# Patient Record
Sex: FEMALE | Race: WHITE | NOT HISPANIC OR LATINO | Employment: PART TIME | URBAN - METROPOLITAN AREA
[De-identification: names, ages, dates, MRNs, and addresses within clinical notes are randomized per-mention and may not be internally consistent; named-entity substitution may affect disease eponyms.]

---

## 2023-03-23 ENCOUNTER — E-VISIT (OUTPATIENT)
Dept: PRIMARY CARE | Facility: CLINIC | Age: 48
End: 2023-03-23
Payer: COMMERCIAL

## 2023-03-23 ENCOUNTER — DOCUMENTATION (OUTPATIENT)
Dept: PRIMARY CARE | Facility: CLINIC | Age: 48
End: 2023-03-23

## 2023-03-23 DIAGNOSIS — L08.9 SKIN INFECTION: Primary | ICD-10-CM

## 2023-03-23 PROCEDURE — 99213 OFFICE O/P EST LOW 20 MIN: CPT | Performed by: NURSE PRACTITIONER

## 2023-03-23 RX ORDER — CEPHALEXIN 500 MG/1
500 CAPSULE ORAL 2 TIMES DAILY
Qty: 10 CAPSULE | Refills: 0 | Status: SHIPPED | OUTPATIENT
Start: 2023-03-23 | End: 2023-03-28

## 2023-03-30 ENCOUNTER — TELEPHONE (OUTPATIENT)
Dept: PRIMARY CARE | Facility: CLINIC | Age: 48
End: 2023-03-30
Payer: COMMERCIAL

## 2023-03-30 NOTE — TELEPHONE ENCOUNTER
Patient did an e-visit for a wound on her finger, can view in chart  Was referred to derm, cannot get in for two weeks  Is going out of town next weekend and would like to been seen ASAP    Asking if we can see her for this or place a referral to derm - STAT if warranted?  Would refer to [facility]

## 2023-03-31 ENCOUNTER — OFFICE VISIT (OUTPATIENT)
Dept: PRIMARY CARE | Facility: CLINIC | Age: 48
End: 2023-03-31
Payer: COMMERCIAL

## 2023-03-31 VITALS
OXYGEN SATURATION: 98 % | DIASTOLIC BLOOD PRESSURE: 73 MMHG | WEIGHT: 149.8 LBS | TEMPERATURE: 97.6 F | HEART RATE: 78 BPM | SYSTOLIC BLOOD PRESSURE: 109 MMHG | HEIGHT: 68 IN | BODY MASS INDEX: 22.7 KG/M2

## 2023-03-31 DIAGNOSIS — S61.208A OPEN WOUND OF LITTLE FINGER: Primary | ICD-10-CM

## 2023-03-31 PROBLEM — R68.82 LOW LIBIDO: Status: ACTIVE | Noted: 2021-02-04

## 2023-03-31 PROBLEM — R16.1 SPLENOMEGALY: Status: ACTIVE | Noted: 2023-03-31

## 2023-03-31 PROBLEM — D64.9 ANEMIA: Status: ACTIVE | Noted: 2023-03-31

## 2023-03-31 PROBLEM — E78.5 HYPERLIPIDEMIA: Status: ACTIVE | Noted: 2023-03-31

## 2023-03-31 PROBLEM — D69.6 THROMBOCYTOPENIA (CMS-HCC): Status: ACTIVE | Noted: 2019-01-10

## 2023-03-31 PROBLEM — F32.A ANXIETY AND DEPRESSION: Status: ACTIVE | Noted: 2023-03-31

## 2023-03-31 PROBLEM — M67.40 GANGLION CYST: Status: ACTIVE | Noted: 2023-03-31

## 2023-03-31 PROBLEM — K21.9 CHRONIC GERD: Status: ACTIVE | Noted: 2023-03-31

## 2023-03-31 PROBLEM — F41.9 ANXIETY AND DEPRESSION: Status: ACTIVE | Noted: 2023-03-31

## 2023-03-31 PROBLEM — K59.09 OTHER CONSTIPATION: Status: ACTIVE | Noted: 2023-03-31

## 2023-03-31 PROBLEM — G43.909 MIGRAINES: Status: ACTIVE | Noted: 2023-03-31

## 2023-03-31 PROBLEM — N89.8 VAGINAL DRYNESS: Status: ACTIVE | Noted: 2021-02-04

## 2023-03-31 PROBLEM — N60.19 DIFFUSE CYSTIC MASTOPATHY: Status: ACTIVE | Noted: 2023-03-31

## 2023-03-31 PROBLEM — E55.9 VITAMIN D DEFICIENCY: Status: ACTIVE | Noted: 2023-03-31

## 2023-03-31 PROBLEM — R79.89 ABNORMAL CBC: Status: ACTIVE | Noted: 2023-03-31

## 2023-03-31 PROBLEM — R23.2 HOT FLASHES: Status: ACTIVE | Noted: 2021-02-04

## 2023-03-31 PROCEDURE — 1036F TOBACCO NON-USER: CPT | Performed by: NURSE PRACTITIONER

## 2023-03-31 PROCEDURE — 99213 OFFICE O/P EST LOW 20 MIN: CPT | Performed by: NURSE PRACTITIONER

## 2023-03-31 RX ORDER — ESCITALOPRAM OXALATE 5 MG/1
1 TABLET ORAL
COMMUNITY
Start: 2021-04-23 | End: 2023-08-29 | Stop reason: SDUPTHER

## 2023-03-31 RX ORDER — CHOLECALCIFEROL (VITAMIN D3) 125 MCG
1 CAPSULE ORAL DAILY
COMMUNITY
Start: 2019-11-27

## 2023-03-31 RX ORDER — FERROUS SULFATE 325(65) MG
65 TABLET ORAL DAILY
COMMUNITY
Start: 2022-09-30 | End: 2023-04-18

## 2023-03-31 RX ORDER — OMEPRAZOLE 20 MG/1
20 TABLET, DELAYED RELEASE ORAL AS NEEDED
COMMUNITY
Start: 2022-08-23 | End: 2023-08-29 | Stop reason: ALTCHOICE

## 2023-03-31 RX ORDER — CEPHALEXIN 500 MG/1
500 CAPSULE ORAL 2 TIMES DAILY
Qty: 14 CAPSULE | Refills: 0 | Status: SHIPPED | OUTPATIENT
Start: 2023-03-31 | End: 2023-04-07

## 2023-03-31 RX ORDER — MULTIVITAMIN
1 TABLET ORAL DAILY
COMMUNITY
Start: 2022-08-23

## 2023-03-31 NOTE — PROGRESS NOTES
"Subjective   Patient ID: Christina Landry is a 47 y.o. female who presents for finger wound (Started out as a bump on pinky/Picked at it a little thought may have had a splinter in it tried to get it out/Has not gotten any better skin is hanging on a little and bleeding little and painful/She did an evisit through my chart and np prescibed antibiotics/For 5 days she has completed that /Was referred to dermatology but cannot get in for 2 weeks/Has appt next Thursday with derm).  HPI  No trauma  Looked like a dry raised bump  Wet  Very tender  Fold of finger seems bruised  Size is larger  No fever  No loss strength or sensation  Elbow felt achy    Review of Systems   All other systems reviewed and are negative.      BP Readings from Last 3 Encounters:   03/31/23 109/73   08/23/22 123/75   04/25/22 112/72      Wt Readings from Last 3 Encounters:   03/31/23 67.9 kg (149 lb 12.8 oz)   08/23/22 65.5 kg (144 lb 6 oz)   04/25/22 64.9 kg (143 lb)      BMI:   Estimated body mass index is 23.12 kg/m² as calculated from the following:    Height as of this encounter: 1.715 m (5' 7.5\").    Weight as of this encounter: 67.9 kg (149 lb 12.8 oz).    Objective   Physical Exam  Constitutional:       Appearance: Normal appearance.   HENT:      Head: Normocephalic.      Nose: Nose normal.   Eyes:      Conjunctiva/sclera: Conjunctivae normal.   Pulmonary:      Effort: Pulmonary effort is normal.   Musculoskeletal:      Cervical back: Normal range of motion.      Comments: R little finger ROM intact  Circular open wound: indurated flap of skin overlying area of yellow pus and ? Cystic growth    Neurological:      Mental Status: She is alert.         Assessment/Plan   Problem List Items Addressed This Visit    None  Visit Diagnoses       Open wound of little finger    -  Primary    restart keflex  arranged for her an appt at [facility] wound center on Monday  patient appreciative    Relevant Medications    cephalexin (Keflex) 500 mg capsule      "

## 2023-04-13 DIAGNOSIS — D64.9 ANEMIA, UNSPECIFIED TYPE: Primary | ICD-10-CM

## 2023-04-18 RX ORDER — FERROUS SULFATE 325(65) MG
TABLET ORAL
Qty: 90 TABLET | Refills: 1 | Status: SHIPPED | OUTPATIENT
Start: 2023-04-18

## 2023-08-26 ASSESSMENT — PROMIS GLOBAL HEALTH SCALE
EMOTIONAL_PROBLEMS: SOMETIMES
RATE_GENERAL_HEALTH: VERY GOOD
CARRYOUT_PHYSICAL_ACTIVITIES: COMPLETELY
RATE_MENTAL_HEALTH: GOOD
RATE_QUALITY_OF_LIFE: VERY GOOD
CARRYOUT_SOCIAL_ACTIVITIES: VERY GOOD
RATE_AVERAGE_PAIN: 0
RATE_SOCIAL_SATISFACTION: VERY GOOD
RATE_AVERAGE_FATIGUE: MILD
RATE_PHYSICAL_HEALTH: GOOD

## 2023-08-29 ENCOUNTER — OFFICE VISIT (OUTPATIENT)
Dept: PRIMARY CARE | Facility: CLINIC | Age: 48
End: 2023-08-29
Payer: COMMERCIAL

## 2023-08-29 VITALS
DIASTOLIC BLOOD PRESSURE: 78 MMHG | WEIGHT: 150 LBS | HEIGHT: 67 IN | OXYGEN SATURATION: 99 % | HEART RATE: 80 BPM | BODY MASS INDEX: 23.54 KG/M2 | TEMPERATURE: 98.5 F | SYSTOLIC BLOOD PRESSURE: 120 MMHG

## 2023-08-29 DIAGNOSIS — Z00.00 WELL ADULT EXAM: Primary | ICD-10-CM

## 2023-08-29 DIAGNOSIS — E78.2 MIXED HYPERLIPIDEMIA: ICD-10-CM

## 2023-08-29 DIAGNOSIS — E55.9 VITAMIN D DEFICIENCY: ICD-10-CM

## 2023-08-29 DIAGNOSIS — F41.9 ANXIETY: ICD-10-CM

## 2023-08-29 DIAGNOSIS — G43.809 OTHER MIGRAINE WITHOUT STATUS MIGRAINOSUS, NOT INTRACTABLE: ICD-10-CM

## 2023-08-29 DIAGNOSIS — E61.1 LOW IRON: ICD-10-CM

## 2023-08-29 DIAGNOSIS — Z12.31 SCREENING MAMMOGRAM, ENCOUNTER FOR: ICD-10-CM

## 2023-08-29 PROBLEM — K21.9 CHRONIC GERD: Status: RESOLVED | Noted: 2023-03-31 | Resolved: 2023-08-29

## 2023-08-29 PROBLEM — R16.1 SPLENOMEGALY: Status: RESOLVED | Noted: 2023-03-31 | Resolved: 2023-08-29

## 2023-08-29 PROBLEM — R10.2 SUPRAPUBIC PRESSURE: Status: ACTIVE | Noted: 2021-02-04

## 2023-08-29 PROBLEM — R23.2 HOT FLASHES: Status: RESOLVED | Noted: 2021-02-04 | Resolved: 2023-08-29

## 2023-08-29 PROBLEM — D69.6 THROMBOCYTOPENIA (CMS-HCC): Status: RESOLVED | Noted: 2019-01-10 | Resolved: 2023-08-29

## 2023-08-29 PROBLEM — R79.89 ABNORMAL CBC: Status: RESOLVED | Noted: 2023-03-31 | Resolved: 2023-08-29

## 2023-08-29 PROCEDURE — 99213 OFFICE O/P EST LOW 20 MIN: CPT | Performed by: INTERNAL MEDICINE

## 2023-08-29 PROCEDURE — 1036F TOBACCO NON-USER: CPT | Performed by: INTERNAL MEDICINE

## 2023-08-29 PROCEDURE — 99396 PREV VISIT EST AGE 40-64: CPT | Performed by: INTERNAL MEDICINE

## 2023-08-29 RX ORDER — ESCITALOPRAM OXALATE 5 MG/1
5 TABLET ORAL
Qty: 90 TABLET | Refills: 3 | Status: SHIPPED | OUTPATIENT
Start: 2023-08-29

## 2023-08-29 ASSESSMENT — PATIENT HEALTH QUESTIONNAIRE - PHQ9
1. LITTLE INTEREST OR PLEASURE IN DOING THINGS: NOT AT ALL
2. FEELING DOWN, DEPRESSED OR HOPELESS: NOT AT ALL
SUM OF ALL RESPONSES TO PHQ9 QUESTIONS 1 AND 2: 0

## 2023-08-29 NOTE — PROGRESS NOTES
"Subjective       Current Issues:  Current concerns include period today  No other concerns.  Sleep: all night  No bowel or bladder issues  No cp or sob or depression  Has dog   Anxiety well controlled w lexapro  Will do gyn exam next visit  Review of Nutrition:  Current diet: good  Exercise discussed  Walking dog    Gen:  no fever  HEENT:  no trouble swallowing  CV:  no dyspnea, cyanosis  Lungs:  no shortness of breath  GI:  no constipation, no blood in stool  Vascular:  no edema  Neuro:   no weakness  Skin:  no rash  MS:no joint swelling  Gu:  no urinary complaints  All other systems have been reviewed and are negative for complaint      Screening Questions:  Objective   /78   Pulse 80   Temp 36.9 °C (98.5 °F) (Temporal)   Ht 1.689 m (5' 6.5\")   Wt 68 kg (150 lb)   SpO2 99%   BMI 23.85 kg/m²       General:   alert and oriented, in no acute distress   Gait:   normal   Skin:   normal   Oral cavity:   lips, mucosa, and tongue normal; teeth and gums normal   Eyes:   sclerae white, pupils equal and reactive   Ears:   normal bilaterally Tms grey   Neck:   no adenopathy and thyroid not enlarged, symmetric, no tenderness/mass/nodules   Lungs:  clear to auscultation bilaterally   Heart:   regular rate and rhythm, S1, S2 normal, no murmur, click, rub or gallop   Abdomen:  soft, non-tender; bowel sounds normal; no masses, no organomegaly   : ne       Extremities:  extremities normal, warm and well-perfused; no cyanosis, clubbing, or edema,   Neuro:  normal without focal findings and muscle tone and strength normal and symmetric       Problem List Items Addressed This Visit       Hyperlipidemia    Vitamin D deficiency    Relevant Orders    Comprehensive Metabolic Panel    TSH with reflex to Free T4 if abnormal    Vitamin D, Total    CBC and Auto Differential    Lipid Panel    Iron and TIBC    Ferritin    Migraines    Relevant Orders    Comprehensive Metabolic Panel    TSH with reflex to Free T4 if abnormal    " Vitamin D, Total    CBC and Auto Differential    Lipid Panel    Iron and TIBC    Ferritin    Vitamin B12     Other Visit Diagnoses       Well adult exam    -  Primary    Relevant Orders    Comprehensive Metabolic Panel    TSH with reflex to Free T4 if abnormal    Vitamin D, Total    CBC and Auto Differential    Lipid Panel    Iron and TIBC    Ferritin    Screening mammogram, encounter for        Relevant Orders    BI mammo bilateral screening tomosynthesis    Comprehensive Metabolic Panel    TSH with reflex to Free T4 if abnormal    Vitamin D, Total    CBC and Auto Differential    Lipid Panel    Iron and TIBC    Ferritin    Low iron        Relevant Orders    Comprehensive Metabolic Panel    TSH with reflex to Free T4 if abnormal    Vitamin D, Total    CBC and Auto Differential    Lipid Panel    Iron and TIBC    Ferritin    Anxiety        Relevant Medications    escitalopram (Lexapro) 5 mg tablet           Chronic conditions reviewed in the assessment and plan.    Continue medications unless specified otherwise.  Previous labs reviewed.     Follow up in 6 months.

## 2023-09-11 ENCOUNTER — LAB (OUTPATIENT)
Dept: LAB | Facility: LAB | Age: 48
End: 2023-09-11
Payer: COMMERCIAL

## 2023-09-11 DIAGNOSIS — Z00.00 WELL ADULT EXAM: ICD-10-CM

## 2023-09-11 DIAGNOSIS — G43.809 OTHER MIGRAINE WITHOUT STATUS MIGRAINOSUS, NOT INTRACTABLE: ICD-10-CM

## 2023-09-11 DIAGNOSIS — E55.9 VITAMIN D DEFICIENCY: ICD-10-CM

## 2023-09-11 DIAGNOSIS — E61.1 LOW IRON: ICD-10-CM

## 2023-09-11 DIAGNOSIS — Z12.31 SCREENING MAMMOGRAM, ENCOUNTER FOR: ICD-10-CM

## 2023-09-11 LAB
ALANINE AMINOTRANSFERASE (SGPT) (U/L) IN SER/PLAS: 15 U/L (ref 7–45)
ALBUMIN (G/DL) IN SER/PLAS: 4.3 G/DL (ref 3.4–5)
ALKALINE PHOSPHATASE (U/L) IN SER/PLAS: 50 U/L (ref 33–110)
ANION GAP IN SER/PLAS: 12 MMOL/L (ref 10–20)
ASPARTATE AMINOTRANSFERASE (SGOT) (U/L) IN SER/PLAS: 16 U/L (ref 9–39)
BASOPHILS (10*3/UL) IN BLOOD BY AUTOMATED COUNT: 0.06 X10E9/L (ref 0–0.1)
BASOPHILS/100 LEUKOCYTES IN BLOOD BY AUTOMATED COUNT: 1.3 % (ref 0–2)
BILIRUBIN TOTAL (MG/DL) IN SER/PLAS: 0.4 MG/DL (ref 0–1.2)
CALCIDIOL (25 OH VITAMIN D3) (NG/ML) IN SER/PLAS: 25 NG/ML
CALCIUM (MG/DL) IN SER/PLAS: 9.3 MG/DL (ref 8.6–10.3)
CARBON DIOXIDE, TOTAL (MMOL/L) IN SER/PLAS: 28 MMOL/L (ref 21–32)
CHLORIDE (MMOL/L) IN SER/PLAS: 104 MMOL/L (ref 98–107)
CHOLESTEROL (MG/DL) IN SER/PLAS: 226 MG/DL (ref 0–199)
CHOLESTEROL IN HDL (MG/DL) IN SER/PLAS: 68.9 MG/DL
CHOLESTEROL/HDL RATIO: 3.3
COBALAMIN (VITAMIN B12) (PG/ML) IN SER/PLAS: 352 PG/ML (ref 211–911)
CREATININE (MG/DL) IN SER/PLAS: 0.66 MG/DL (ref 0.5–1.05)
EOSINOPHILS (10*3/UL) IN BLOOD BY AUTOMATED COUNT: 0.13 X10E9/L (ref 0–0.7)
EOSINOPHILS/100 LEUKOCYTES IN BLOOD BY AUTOMATED COUNT: 2.8 % (ref 0–6)
ERYTHROCYTE DISTRIBUTION WIDTH (RATIO) BY AUTOMATED COUNT: 12.6 % (ref 11.5–14.5)
ERYTHROCYTE MEAN CORPUSCULAR HEMOGLOBIN CONCENTRATION (G/DL) BY AUTOMATED: 31.8 G/DL (ref 32–36)
ERYTHROCYTE MEAN CORPUSCULAR VOLUME (FL) BY AUTOMATED COUNT: 93 FL (ref 80–100)
ERYTHROCYTES (10*6/UL) IN BLOOD BY AUTOMATED COUNT: 4.29 X10E12/L (ref 4–5.2)
FERRITIN (UG/LL) IN SER/PLAS: 34 UG/L (ref 8–150)
GFR FEMALE: >90 ML/MIN/1.73M2
GLUCOSE (MG/DL) IN SER/PLAS: 87 MG/DL (ref 74–99)
HEMATOCRIT (%) IN BLOOD BY AUTOMATED COUNT: 40 % (ref 36–46)
HEMOGLOBIN (G/DL) IN BLOOD: 12.7 G/DL (ref 12–16)
IMMATURE GRANULOCYTES/100 LEUKOCYTES IN BLOOD BY AUTOMATED COUNT: 0.2 % (ref 0–0.9)
IRON (UG/DL) IN SER/PLAS: 93 UG/DL (ref 35–150)
IRON BINDING CAPACITY (UG/DL) IN SER/PLAS: 332 UG/DL (ref 240–445)
IRON SATURATION (%) IN SER/PLAS: 28 % (ref 25–45)
LDL: 137 MG/DL (ref 0–99)
LEUKOCYTES (10*3/UL) IN BLOOD BY AUTOMATED COUNT: 4.6 X10E9/L (ref 4.4–11.3)
LYMPHOCYTES (10*3/UL) IN BLOOD BY AUTOMATED COUNT: 0.87 X10E9/L (ref 1.2–4.8)
LYMPHOCYTES/100 LEUKOCYTES IN BLOOD BY AUTOMATED COUNT: 18.8 % (ref 13–44)
MONOCYTES (10*3/UL) IN BLOOD BY AUTOMATED COUNT: 0.35 X10E9/L (ref 0.1–1)
MONOCYTES/100 LEUKOCYTES IN BLOOD BY AUTOMATED COUNT: 7.6 % (ref 2–10)
NEUTROPHILS (10*3/UL) IN BLOOD BY AUTOMATED COUNT: 3.2 X10E9/L (ref 1.2–7.7)
NEUTROPHILS/100 LEUKOCYTES IN BLOOD BY AUTOMATED COUNT: 69.3 % (ref 40–80)
PLATELETS (10*3/UL) IN BLOOD AUTOMATED COUNT: 170 X10E9/L (ref 150–450)
POTASSIUM (MMOL/L) IN SER/PLAS: 4.8 MMOL/L (ref 3.5–5.3)
PROTEIN TOTAL: 6.4 G/DL (ref 6.4–8.2)
SODIUM (MMOL/L) IN SER/PLAS: 139 MMOL/L (ref 136–145)
THYROTROPIN (MIU/L) IN SER/PLAS BY DETECTION LIMIT <= 0.05 MIU/L: 1.04 MIU/L (ref 0.44–3.98)
TRIGLYCERIDE (MG/DL) IN SER/PLAS: 102 MG/DL (ref 0–149)
UREA NITROGEN (MG/DL) IN SER/PLAS: 11 MG/DL (ref 6–23)
VLDL: 20 MG/DL (ref 0–40)

## 2023-09-11 PROCEDURE — 83550 IRON BINDING TEST: CPT

## 2023-09-11 PROCEDURE — 36415 COLL VENOUS BLD VENIPUNCTURE: CPT

## 2023-09-11 PROCEDURE — 82306 VITAMIN D 25 HYDROXY: CPT

## 2023-09-11 PROCEDURE — 84443 ASSAY THYROID STIM HORMONE: CPT

## 2023-09-11 PROCEDURE — 83540 ASSAY OF IRON: CPT

## 2023-09-11 PROCEDURE — 82728 ASSAY OF FERRITIN: CPT

## 2023-09-11 PROCEDURE — 82607 VITAMIN B-12: CPT

## 2023-09-11 PROCEDURE — 80053 COMPREHEN METABOLIC PANEL: CPT

## 2023-09-11 PROCEDURE — 85025 COMPLETE CBC W/AUTO DIFF WBC: CPT

## 2023-09-11 PROCEDURE — 80061 LIPID PANEL: CPT

## 2023-09-27 DIAGNOSIS — E55.9 VITAMIN D DEFICIENCY: ICD-10-CM

## 2023-10-03 ENCOUNTER — APPOINTMENT (OUTPATIENT)
Dept: PRIMARY CARE | Facility: CLINIC | Age: 48
End: 2023-10-03
Payer: COMMERCIAL

## 2023-10-03 PROCEDURE — 87086 URINE CULTURE/COLONY COUNT: CPT

## 2023-10-04 ENCOUNTER — APPOINTMENT (OUTPATIENT)
Dept: PRIMARY CARE | Facility: CLINIC | Age: 48
End: 2023-10-04
Payer: COMMERCIAL

## 2023-10-04 ENCOUNTER — LAB REQUISITION (OUTPATIENT)
Dept: LAB | Facility: HOSPITAL | Age: 48
End: 2023-10-04
Payer: COMMERCIAL

## 2023-10-05 LAB — BACTERIA UR CULT: NORMAL

## 2024-03-08 ENCOUNTER — OFFICE VISIT (OUTPATIENT)
Dept: PRIMARY CARE | Facility: CLINIC | Age: 49
End: 2024-03-08
Payer: COMMERCIAL

## 2024-03-08 ENCOUNTER — HOSPITAL ENCOUNTER (OUTPATIENT)
Dept: RADIOLOGY | Facility: CLINIC | Age: 49
Discharge: HOME | End: 2024-03-08
Payer: COMMERCIAL

## 2024-03-08 ENCOUNTER — APPOINTMENT (OUTPATIENT)
Dept: PRIMARY CARE | Facility: CLINIC | Age: 49
End: 2024-03-08
Payer: COMMERCIAL

## 2024-03-08 ENCOUNTER — LAB (OUTPATIENT)
Dept: LAB | Facility: LAB | Age: 49
End: 2024-03-08
Payer: COMMERCIAL

## 2024-03-08 VITALS
DIASTOLIC BLOOD PRESSURE: 69 MMHG | SYSTOLIC BLOOD PRESSURE: 108 MMHG | BODY MASS INDEX: 24.33 KG/M2 | TEMPERATURE: 98.2 F | HEIGHT: 67 IN | WEIGHT: 155 LBS | HEART RATE: 88 BPM | OXYGEN SATURATION: 97 %

## 2024-03-08 DIAGNOSIS — E55.9 VITAMIN D DEFICIENCY: ICD-10-CM

## 2024-03-08 DIAGNOSIS — R00.2 FLUTTERING SENSATION OF HEART: ICD-10-CM

## 2024-03-08 DIAGNOSIS — R00.2 FLUTTERING SENSATION OF HEART: Primary | ICD-10-CM

## 2024-03-08 LAB
25(OH)D3 SERPL-MCNC: 34 NG/ML (ref 30–100)
ALBUMIN SERPL BCP-MCNC: 4.1 G/DL (ref 3.4–5)
ALP SERPL-CCNC: 50 U/L (ref 33–110)
ALT SERPL W P-5'-P-CCNC: 12 U/L (ref 7–45)
ANION GAP SERPL CALC-SCNC: <7 MMOL/L (ref 10–20)
AST SERPL W P-5'-P-CCNC: 16 U/L (ref 9–39)
BASOPHILS # BLD AUTO: 0.05 X10*3/UL (ref 0–0.1)
BASOPHILS NFR BLD AUTO: 1 %
BILIRUB SERPL-MCNC: 0.3 MG/DL (ref 0–1.2)
BUN SERPL-MCNC: 11 MG/DL (ref 6–23)
CALCIUM SERPL-MCNC: 9.3 MG/DL (ref 8.6–10.3)
CHLORIDE SERPL-SCNC: 105 MMOL/L (ref 98–107)
CO2 SERPL-SCNC: 32 MMOL/L (ref 21–32)
CREAT SERPL-MCNC: 0.58 MG/DL (ref 0.5–1.05)
EGFRCR SERPLBLD CKD-EPI 2021: >90 ML/MIN/1.73M*2
EOSINOPHIL # BLD AUTO: 0.08 X10*3/UL (ref 0–0.7)
EOSINOPHIL NFR BLD AUTO: 1.7 %
ERYTHROCYTE [DISTWIDTH] IN BLOOD BY AUTOMATED COUNT: 13.1 % (ref 11.5–14.5)
FERRITIN SERPL-MCNC: 34 NG/ML (ref 8–150)
GLUCOSE SERPL-MCNC: 89 MG/DL (ref 74–99)
HCT VFR BLD AUTO: 36.6 % (ref 36–46)
HGB BLD-MCNC: 11.8 G/DL (ref 12–16)
IMM GRANULOCYTES # BLD AUTO: 0.01 X10*3/UL (ref 0–0.7)
IMM GRANULOCYTES NFR BLD AUTO: 0.2 % (ref 0–0.9)
IRON SATN MFR SERPL: 16 % (ref 25–45)
IRON SERPL-MCNC: 50 UG/DL (ref 35–150)
LYMPHOCYTES # BLD AUTO: 1.09 X10*3/UL (ref 1.2–4.8)
LYMPHOCYTES NFR BLD AUTO: 22.6 %
MAGNESIUM SERPL-MCNC: 1.97 MG/DL (ref 1.6–2.4)
MCH RBC QN AUTO: 28.9 PG (ref 26–34)
MCHC RBC AUTO-ENTMCNC: 32.2 G/DL (ref 32–36)
MCV RBC AUTO: 90 FL (ref 80–100)
MONOCYTES # BLD AUTO: 0.4 X10*3/UL (ref 0.1–1)
MONOCYTES NFR BLD AUTO: 8.3 %
NEUTROPHILS # BLD AUTO: 3.2 X10*3/UL (ref 1.2–7.7)
NEUTROPHILS NFR BLD AUTO: 66.2 %
NRBC BLD-RTO: 0 /100 WBCS (ref 0–0)
PLATELET # BLD AUTO: 174 X10*3/UL (ref 150–450)
POTASSIUM SERPL-SCNC: 4.2 MMOL/L (ref 3.5–5.3)
PROT SERPL-MCNC: 6.4 G/DL (ref 6.4–8.2)
RBC # BLD AUTO: 4.08 X10*6/UL (ref 4–5.2)
SODIUM SERPL-SCNC: 139 MMOL/L (ref 136–145)
TIBC SERPL-MCNC: 312 UG/DL (ref 240–445)
TSH SERPL-ACNC: 0.88 MIU/L (ref 0.44–3.98)
UIBC SERPL-MCNC: 262 UG/DL (ref 110–370)
VIT B12 SERPL-MCNC: 472 PG/ML (ref 211–911)
WBC # BLD AUTO: 4.8 X10*3/UL (ref 4.4–11.3)

## 2024-03-08 PROCEDURE — 83550 IRON BINDING TEST: CPT

## 2024-03-08 PROCEDURE — 85025 COMPLETE CBC W/AUTO DIFF WBC: CPT

## 2024-03-08 PROCEDURE — 83540 ASSAY OF IRON: CPT

## 2024-03-08 PROCEDURE — 99214 OFFICE O/P EST MOD 30 MIN: CPT | Performed by: NURSE PRACTITIONER

## 2024-03-08 PROCEDURE — 82728 ASSAY OF FERRITIN: CPT

## 2024-03-08 PROCEDURE — 36415 COLL VENOUS BLD VENIPUNCTURE: CPT

## 2024-03-08 PROCEDURE — 80053 COMPREHEN METABOLIC PANEL: CPT

## 2024-03-08 PROCEDURE — 1036F TOBACCO NON-USER: CPT | Performed by: NURSE PRACTITIONER

## 2024-03-08 PROCEDURE — 82306 VITAMIN D 25 HYDROXY: CPT

## 2024-03-08 PROCEDURE — 82607 VITAMIN B-12: CPT

## 2024-03-08 PROCEDURE — 83735 ASSAY OF MAGNESIUM: CPT

## 2024-03-08 PROCEDURE — 93000 ELECTROCARDIOGRAM COMPLETE: CPT | Performed by: NURSE PRACTITIONER

## 2024-03-08 PROCEDURE — 84443 ASSAY THYROID STIM HORMONE: CPT

## 2024-03-08 PROCEDURE — 71046 X-RAY EXAM CHEST 2 VIEWS: CPT

## 2024-03-08 PROCEDURE — 71046 X-RAY EXAM CHEST 2 VIEWS: CPT | Performed by: RADIOLOGY

## 2024-03-08 ASSESSMENT — PATIENT HEALTH QUESTIONNAIRE - PHQ9
SUM OF ALL RESPONSES TO PHQ9 QUESTIONS 1 AND 2: 0
2. FEELING DOWN, DEPRESSED OR HOPELESS: NOT AT ALL
1. LITTLE INTEREST OR PLEASURE IN DOING THINGS: NOT AT ALL

## 2024-03-08 NOTE — PROGRESS NOTES
Problem List Items Addressed This Visit    None  Visit Diagnoses       Fluttering sensation of heart    -  Primary    on/off at least 12 y  has seen cards  more frequent past couple days  EKG stable  labs  chest XR given recent illness  zio  stop caffeine  fu cards    Relevant Orders    ECG 12 lead (Clinic Performed) (Completed)    CBC and Auto Differential    Comprehensive Metabolic Panel    Magnesium    Iron and TIBC    Ferritin    Vitamin B12    XR chest 2 views    TSH with reflex to Free T4 if abnormal    Referral to Cardiology    Holter or Event Cardiac Monitor             Subjective   Patient ID: Christina Landry is a 48 y.o. female who presents for Irregular Heart Beat (Feels like flutters in chest /Doesn't get lighted or dizzy /No numbness in any arms or pain associated with it/Had it when her daughter was younger - over the last 12 years /10x's yesterday ).  HPI  Heart flutter  Comes & goes   She did see cards for this about 12 or so years ago  She did wear monitor  Recalls extra beat here and there    Noticed couple days ago a few times  Yesterday about 10 times  Couple times so far today    Exacerbated by:   Relieved by: ? Meditation   Caffeine sensitive  - 16 oz daily coffee     Recent illness  Early Feb  Last almost 2 weeks  Was covid neg x 2     No new meds, herbals, supps or teas  Tries to drink water  - doesn't always     Component      Latest Ref Rng 9/11/2023   WBC      4.4 - 11.3 x10E9/L 4.6    RBC      4.00 - 5.20 x10E12/L 4.29    HEMOGLOBIN      12.0 - 16.0 g/dL 12.7    HEMATOCRIT      36.0 - 46.0 % 40.0    MCV      80 - 100 fL 93    MCHC      32.0 - 36.0 g/dL 31.8 (L)    Platelets      150 - 450 x10E9/L 170    RED CELL DISTRIBUTION WIDTH      11.5 - 14.5 % 12.6    Neutrophils %      40.0 - 80.0 % 69.3    Immature Granulocytes %, Automated      0.0 - 0.9 % 0.2    Lymphocytes %      13.0 - 44.0 % 18.8    Monocytes %      2.0 - 10.0 % 7.6    Eosinophils %      0.0 - 6.0 % 2.8    Basophils %      0.0 -  "2.0 % 1.3    Neutrophils Absolute      1.20 - 7.70 x10E9/L 3.20    Lymphocytes Absolute      1.20 - 4.80 x10E9/L 0.87 (L)    Monocytes Absolute      0.10 - 1.00 x10E9/L 0.35    Eosinophils Absolute      0.00 - 0.70 x10E9/L 0.13    Basophils Absolute      0.00 - 0.10 x10E9/L 0.06    GLUCOSE      74 - 99 mg/dL 87    SODIUM      136 - 145 mmol/L 139    POTASSIUM      3.5 - 5.3 mmol/L 4.8    CHLORIDE      98 - 107 mmol/L 104    Bicarbonate      21 - 32 mmol/L 28    Anion Gap      10 - 20 mmol/L 12    Blood Urea Nitrogen      6 - 23 mg/dL 11    Creatinine      0.50 - 1.05 mg/dL 0.66    GFR Female      >90 mL/min/1.73m2 >90    Calcium      8.6 - 10.3 mg/dL 9.3    Albumin      3.4 - 5.0 g/dL 4.3    Alkaline Phosphatase      33 - 110 U/L 50    Total Protein      6.4 - 8.2 g/dL 6.4    AST      9 - 39 U/L 16    Bilirubin Total      0.0 - 1.2 mg/dL 0.4    ALT      7 - 45 U/L 15    CHOLESTEROL      0 - 199 mg/dL 226 (H)    HDL CHOLESTEROL      mg/dL 68.9    Cholesterol/HDL Ratio 3.3    LDL Calculated      0 - 99 mg/dL 137 (H)    VLDL      0 - 40 mg/dL 20    TRIGLYCERIDES      0 - 149 mg/dL 102    IRON      35 - 150 ug/dL 93    TIBC      240 - 445 ug/dL 332    % Saturation      25 - 45 % 28    Thyroid Stimulating Hormone      0.44 - 3.98 mIU/L 1.04    Vitamin D, 25-Hydroxy, Total      ng/mL 25 !    FERRITIN      8 - 150 ug/L 34    Vitamin B12      211 - 911 pg/mL 352         Review of Systems   All other systems reviewed and are negative.      BP Readings from Last 3 Encounters:   03/08/24 108/69   08/29/23 120/78   03/31/23 109/73      Wt Readings from Last 3 Encounters:   03/08/24 70.3 kg (155 lb)   08/29/23 68 kg (150 lb)   03/31/23 67.9 kg (149 lb 12.8 oz)      BMI:   Estimated body mass index is 24.64 kg/m² as calculated from the following:    Height as of this encounter: 1.689 m (5' 6.5\").    Weight as of this encounter: 70.3 kg (155 lb).    Objective   Physical Exam  Constitutional:       General: She is not in acute " distress.  HENT:      Head: Normocephalic and atraumatic.      Right Ear: Tympanic membrane and external ear normal.      Left Ear: Tympanic membrane and external ear normal.      Nose: Nose normal.      Mouth/Throat:      Mouth: Mucous membranes are moist.   Eyes:      Extraocular Movements: Extraocular movements intact.      Conjunctiva/sclera: Conjunctivae normal.   Cardiovascular:      Rate and Rhythm: Normal rate and regular rhythm.      Pulses: Normal pulses.      Comments: Neg bilat carotid bruit   Pulmonary:      Effort: Pulmonary effort is normal.      Breath sounds: Normal breath sounds.   Abdominal:      General: Bowel sounds are normal.      Palpations: Abdomen is soft.   Musculoskeletal:         General: Normal range of motion.      Cervical back: Normal range of motion and neck supple.   Skin:     General: Skin is warm and dry.   Neurological:      General: No focal deficit present.      Mental Status: She is alert.   Psychiatric:         Mood and Affect: Mood normal.

## 2024-03-11 DIAGNOSIS — E55.9 VITAMIN D DEFICIENCY: ICD-10-CM

## 2024-03-14 ENCOUNTER — HOSPITAL ENCOUNTER (OUTPATIENT)
Dept: CARDIOLOGY | Facility: CLINIC | Age: 49
Discharge: HOME | End: 2024-03-14
Payer: COMMERCIAL

## 2024-03-14 DIAGNOSIS — R00.2 FLUTTERING SENSATION OF HEART: ICD-10-CM

## 2024-03-14 PROCEDURE — 93248 EXT ECG>7D<15D REV&INTERPJ: CPT | Performed by: INTERNAL MEDICINE

## 2024-03-14 PROCEDURE — 93246 EXT ECG>7D<15D RECORDING: CPT

## 2024-03-29 ENCOUNTER — APPOINTMENT (OUTPATIENT)
Dept: PRIMARY CARE | Facility: CLINIC | Age: 49
End: 2024-03-29
Payer: COMMERCIAL

## 2024-04-02 ENCOUNTER — APPOINTMENT (OUTPATIENT)
Dept: CARDIOLOGY | Facility: CLINIC | Age: 49
End: 2024-04-02
Payer: COMMERCIAL

## 2024-04-05 ENCOUNTER — APPOINTMENT (OUTPATIENT)
Dept: PRIMARY CARE | Facility: CLINIC | Age: 49
End: 2024-04-05
Payer: COMMERCIAL

## 2024-04-09 PROBLEM — I49.3 PVC'S (PREMATURE VENTRICULAR CONTRACTIONS): Status: ACTIVE | Noted: 2024-04-09

## 2024-04-16 ENCOUNTER — APPOINTMENT (OUTPATIENT)
Dept: CARDIOLOGY | Facility: CLINIC | Age: 49
End: 2024-04-16
Payer: COMMERCIAL

## 2024-04-18 ENCOUNTER — OFFICE VISIT (OUTPATIENT)
Dept: CARDIOLOGY | Facility: CLINIC | Age: 49
End: 2024-04-18
Payer: COMMERCIAL

## 2024-04-18 VITALS
HEIGHT: 66 IN | OXYGEN SATURATION: 97 % | RESPIRATION RATE: 18 BRPM | WEIGHT: 152.2 LBS | DIASTOLIC BLOOD PRESSURE: 74 MMHG | BODY MASS INDEX: 24.46 KG/M2 | SYSTOLIC BLOOD PRESSURE: 110 MMHG | HEART RATE: 89 BPM

## 2024-04-18 DIAGNOSIS — I49.3 PVC (PREMATURE VENTRICULAR CONTRACTION): ICD-10-CM

## 2024-04-18 DIAGNOSIS — I47.29 PAROXYSMAL VENTRICULAR TACHYCARDIA (MULTI): ICD-10-CM

## 2024-04-18 DIAGNOSIS — R00.2 FLUTTERING SENSATION OF HEART: Primary | ICD-10-CM

## 2024-04-18 PROBLEM — I47.20 PAROXYSMAL VENTRICULAR TACHYCARDIA: Status: ACTIVE | Noted: 2024-04-18

## 2024-04-18 PROCEDURE — 93000 ELECTROCARDIOGRAM COMPLETE: CPT | Performed by: NURSE PRACTITIONER

## 2024-04-18 PROCEDURE — 1036F TOBACCO NON-USER: CPT | Performed by: NURSE PRACTITIONER

## 2024-04-18 PROCEDURE — 99214 OFFICE O/P EST MOD 30 MIN: CPT | Performed by: NURSE PRACTITIONER

## 2024-04-18 NOTE — PROGRESS NOTES
Name : Christina Landry   : 1975   MRN : [id number]   ENC Date : 2024    CC:   NPV- Heart Flutter     HPI:    Christina Landry is a 48 y.o. female with PMHx sig for HIREN who presents today as above.    Started getting palpations a little over a month ago. Notices it most when she is just sitting. The day she was taking her daughter on a college visit it happened 8-10x, further recurred 3-4 days in a row. No associated chest pain, pressure, SOB/QUIGLEY, PND, orthopnea, LE edema, lightheadedness, dizziness, or syncope.     Denies use of weight loss supplements, body-building supplements, energy supplements, amphetamines, and cocaine.     Caffeine- 16-20oz of coffee a day; sometimes Tea    Not much exercise beyond walking the dog every day for 20 mins    Known HIREN & has been on supplement for a long time. Hgb 11.8. Iron %sat 16. Mg 1.97. K 4.2 & TSH was normal    Evaluated by Cardiology in  for same symptoms. Per Cardiology note, echo was normal with no significant valvular abnormalities & the event monitor showed NSR.    2 week event monitor was done prior to this visit. Her symptoms correlate with PVCs & ST.    CV Diagnostics:  Event monitor 3/14/24 - 3/28/24: predominantly Sinus rhythm. Max  bpm, minimum HR 56 bpm, PVC/PAC burden <1%. 1 occurrence of VT, 3 beats,  bpm. 19 patient triggers correlating with SVE, PVC & Sinus tachycardia    ROS: unless otherwise noted in the history of present illness, all other systems were reviewed and they are negative for complaints     PMH: HIREN    PSH: none    SHx:  She reports that she has never smoked. She has never used smokeless tobacco. She reports current alcohol use. She reports that she does not use drugs.  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    Outpatient Medications:  Current Outpatient Medications   Medication Instructions    cholecalciferol (Vitamin D-3) 125 MCG (5000 UT) capsule 1 capsule, oral, Daily    escitalopram (LEXAPRO) 5 mg, oral, Every Day    ferrous sulfate 325 (65 Fe) MG tablet TAKE 1 TABLET BY MOUTH EVERY DAY    L. acidophilus/Bifid. animalis 32 billion cell capsule Take 1 capsule by mouth if needed.    multivitamin tablet 1 tablet, oral, Daily, Does not have iron     Last Recorded Vitals:  Vitals:    04/18/24 1306   BP: 110/74   BP Location: Left arm   Patient Position: Sitting   Pulse: 89   Resp: 18   SpO2: 97%   Weight: 69 kg (152 lb 3.2 oz)   Height: 1.676 m (5' 6\")     Physical Exam:  On exam Ms. Landry appears her stated age, is alert and oriented x3, and in no acute distress. Her sclera are anicteric and her oropharynx has moist mucous membranes. Her neck is supple and without thyromegaly. The JVP is ~5 cm of water above the right atrium. Her cardiac exam has regular rhythm, normal S1, S2. No S3/4. There are no murmurs. Her lungs are clear to auscultation bilaterally and there is no dullness to percussion. Her abdomen is soft, nontender with normoactive bowel sounds. There is no HJR. The extremities are warm and without edema. The skin is dry. There is no rash present. The distal pulses are 2-3+ in all four extremities. Her mood and affect are appropriate for todays encounter.      Last Labs:  CBC -  Lab Results   Component Value Date    WBC 4.8 03/08/2024    HGB 11.8 (L) 03/08/2024    HCT 36.6 03/08/2024    MCV 90 03/08/2024     03/08/2024       CMP -  Lab Results   Component Value Date    CALCIUM 9.3 03/08/2024    PROT 6.4 03/08/2024    ALBUMIN 4.1 03/08/2024    AST 16 03/08/2024    ALT 12 03/08/2024    ALKPHOS 50 03/08/2024    BILITOT 0.3 03/08/2024       LIPID PANEL -   Lab Results   Component Value Date    CHOL 226 (H) 09/11/2023    TRIG 102 09/11/2023    HDL 68.9 09/11/2023    CHHDL 3.3 " 09/11/2023    LDLF 137 (H) 09/11/2023    VLDL 20 09/11/2023       RENAL FUNCTION PANEL -   Lab Results   Component Value Date    GLUCOSE 89 03/08/2024     03/08/2024    K 4.2 03/08/2024     03/08/2024    CO2 32 03/08/2024    ANIONGAP <7 (L) 03/08/2024    BUN 11 03/08/2024    CREATININE 0.58 03/08/2024    CALCIUM 9.3 03/08/2024    ALBUMIN 4.1 03/08/2024        Lab Results   Component Value Date    HGBA1C 5.0 04/23/2021     I have reviewed the above labs & diagnostics    Assessment/Plan:  Paroxysmal ventricular tachycardia.   PVCs  Palpitations    Though only 3 beats of pVT noted on monitor, does not exercise routinely to assess functional capacity. Feels palpitations but no associated CP, SOB, Lightheadedness, N/V. This very well may be exacerbated by her HIREN & caffeine intake.    Recommendations:  - increase iron supplement  - start Magnesium supplement  - cease caffeine intake   - repeat echocardiogram  - exercise stress test    Virtual follow up after to review results  Tracy M Schwab, APRN-CNP

## 2024-04-18 NOTE — PATIENT INSTRUCTIONS
- start taking magnesium oxide supplement. 400-500mg a day   - make sure to take your iron supplement. Discuss increasing supplement with your Primary CNP  - no Caffeine  - Echocardiogram (ultrasound of your heart) to assess the function as well as for any valve abnormalities  - exercise stress test  - virtual follow up to review results    It was my pleasure to meet you. I look forward to being your cardiac Nurse Practitioner. I am a huge believer in communicating with my patients. Please contact me at any time, if anything is not clear to you regarding anything we have discussed, or if new questions occur to you.

## 2024-05-16 DIAGNOSIS — I47.20 VT (VENTRICULAR TACHYCARDIA) (MULTI): Primary | ICD-10-CM

## 2024-06-11 ENCOUNTER — APPOINTMENT (OUTPATIENT)
Dept: CARDIOLOGY | Facility: HOSPITAL | Age: 49
End: 2024-06-11
Payer: COMMERCIAL

## 2024-06-12 ENCOUNTER — APPOINTMENT (OUTPATIENT)
Dept: PRIMARY CARE | Facility: CLINIC | Age: 49
End: 2024-06-12
Payer: COMMERCIAL

## 2024-06-12 VITALS
WEIGHT: 151 LBS | TEMPERATURE: 97.3 F | HEIGHT: 66 IN | BODY MASS INDEX: 24.27 KG/M2 | SYSTOLIC BLOOD PRESSURE: 115 MMHG | HEART RATE: 80 BPM | DIASTOLIC BLOOD PRESSURE: 81 MMHG | OXYGEN SATURATION: 98 %

## 2024-06-12 DIAGNOSIS — Z00.00 WELL ADULT EXAM: Primary | ICD-10-CM

## 2024-06-12 DIAGNOSIS — R00.2 PALPITATIONS: ICD-10-CM

## 2024-06-12 DIAGNOSIS — E55.9 VITAMIN D DEFICIENCY: ICD-10-CM

## 2024-06-12 DIAGNOSIS — F41.9 ANXIETY: ICD-10-CM

## 2024-06-12 DIAGNOSIS — Z12.4 SCREENING FOR CERVICAL CANCER: ICD-10-CM

## 2024-06-12 DIAGNOSIS — Z12.31 SCREENING MAMMOGRAM FOR BREAST CANCER: ICD-10-CM

## 2024-06-12 PROCEDURE — 1036F TOBACCO NON-USER: CPT | Performed by: INTERNAL MEDICINE

## 2024-06-12 PROCEDURE — 99396 PREV VISIT EST AGE 40-64: CPT | Performed by: INTERNAL MEDICINE

## 2024-06-12 RX ORDER — ESCITALOPRAM OXALATE 5 MG/1
5 TABLET ORAL
Qty: 90 TABLET | Refills: 3 | Status: SHIPPED | OUTPATIENT
Start: 2024-06-12

## 2024-06-12 NOTE — PROGRESS NOTES
"Subjective       Current Issues:  Current concerns include none  Echo was to be done  Palpitations better  Added magnesium  Cut caffeine  No abnl pap  Regular menses  Uses 2 lexapro one week of the month  . w vasectomy  Sleep: all night  No bowel or bladder issues  No cp or sob or depression    Review of Nutrition:  Current diet: eats well  Exercise discussed-wants to start    Gen:  no fever  HEENT:  no trouble swallowing  CV:  no dyspnea, cyanosis  Lungs:  no shortness of breath  GI:  no constipation, no blood in stool  Vascular:  no edema  Neuro:   no weakness  Skin:  no rash  MS:no joint swelling  Gu:  no urinary complaints  All other systems have been reviewed and are negative for complaint      Screening Questions:  Objective   /81   Pulse 80   Temp 36.3 °C (97.3 °F) (Temporal)   Ht 1.676 m (5' 6\")   Wt 68.5 kg (151 lb)   SpO2 98%   BMI 24.37 kg/m²       General:   alert and oriented, in no acute distress   Gait:   normal   Skin:   normal   Oral cavity:   lips, mucosa, and tongue normal; teeth and gums normal   Eyes:   sclerae white, pupils equal and reactive   Ears:   normal bilaterally Tms grey   Neck:   no adenopathy and thyroid not enlarged, symmetric, no tenderness/mass/nodules   Lungs:  clear to auscultation bilaterally   Heart:   regular rate and rhythm, S1, S2 normal, no murmur, click, rub or gallop   Abdomen:  soft, non-tender; bowel sounds normal; no masses, no organomegaly   : breasts no masses inspected or palpated  no axillary masses  no lesions  external genitalia:  no lesions  urethral meatus: no masses seen  Vagina: no masses seen, no discharge  Cervix: no lesions seen   uterus: normal size   adnexa: normal size  No cmt  Anal area no lesions    Rectal no masses, heme neg  pap done         Extremities:  extremities normal, warm and well-perfused; no cyanosis, clubbing, or edema,   Neuro:  normal without focal findings and muscle tone and strength normal and symmetric     "       Christina was seen today for annual exam.  Diagnoses and all orders for this visit:  Well adult exam (Primary)  Palpitations  -     CT cardiac scoring wo IV contrast; Future  -     CBC and Auto Differential; Future  -     Ferritin; Future  -     Iron and TIBC; Future  Screening mammogram for breast cancer  -     BI mammo bilateral screening tomosynthesis; Future  Anxiety  -     escitalopram (Lexapro) 5 mg tablet; Take 1 tablet (5 mg) by mouth once every day.  Screening for cervical cancer  -     THINPREP PAP TEST  Vitamin D deficiency  -     Vitamin D 25-Hydroxy,Total (for eval of Vitamin D levels); Future    Chronic conditions reviewed in the assessment and plan.    Continue medications unless specified otherwise.  Previous labs reviewed.   Other specialty provider notes reviewed.    Follow up in 6 months or prn

## 2024-06-18 ENCOUNTER — APPOINTMENT (OUTPATIENT)
Dept: CARDIOLOGY | Facility: CLINIC | Age: 49
End: 2024-06-18
Payer: COMMERCIAL

## 2024-06-18 VITALS — BODY MASS INDEX: 24.11 KG/M2 | HEIGHT: 66 IN | WEIGHT: 150 LBS

## 2024-06-18 DIAGNOSIS — R00.2 FLUTTERING SENSATION OF HEART: Primary | ICD-10-CM

## 2024-06-18 PROBLEM — E61.1 IRON DEFICIENCY: Status: ACTIVE | Noted: 2023-09-11

## 2024-06-18 PROCEDURE — 99213 OFFICE O/P EST LOW 20 MIN: CPT | Performed by: NURSE PRACTITIONER

## 2024-06-18 PROCEDURE — 1036F TOBACCO NON-USER: CPT | Performed by: NURSE PRACTITIONER

## 2024-06-18 ASSESSMENT — PAIN SCALES - GENERAL: PAINLEVEL: 0-NO PAIN

## 2024-06-18 NOTE — PROGRESS NOTES
Name : Christina Landry   : 1975   MRN : [id number]   ENC Date : 2024    CC:   Follow up palpitations     HPI:    Christina Landry is a 48 y.o. female with PMHx sig for HIREN who presents today as above.    Previous Encounter:  Started getting palpations a little over a month ago. Notices it most when she is just sitting. The day she was taking her daughter on a college visit it happened 8-10x, further recurred 3-4 days in a row. No associated chest pain, pressure, SOB/QUIGLEY, PND, orthopnea, LE edema, lightheadedness, dizziness, or syncope.     Denies use of weight loss supplements, body-building supplements, energy supplements, amphetamines, and cocaine.     Caffeine- 16-20oz of coffee a day; sometimes Tea    Not much exercise beyond walking the dog every day for 20 mins    Known HIREN & has been on supplement for a long time. Hgb 11.8. Iron %sat 16. Mg 1.97. K 4.2 & TSH was normal    Evaluated by Cardiology in  for same symptoms. Per Cardiology note, echo was normal with no significant valvular abnormalities & the event monitor showed NSR.    2 week event monitor was done prior to this visit. Her symptoms correlate with PVCs & ST.    VV 24: Since starting magnesium, cutting caffeine intake & consistently taking her Iron supplement her symptoms have significantly decreased.    CV Diagnostics:  Event monitor 3/14/24 - 3/28/24: predominantly Sinus rhythm. Max  bpm, minimum HR 56 bpm, PVC/PAC burden <1%. 1 occurrence of VT, 3 beats,  bpm. 19 patient triggers correlating with SVE, PVC & Sinus tachycardia    ROS: unless otherwise noted in the history of present illness, all other systems were reviewed and they are negative for complaints     PMH: HIREN    PSH: none    SHx:  She reports that she has never smoked. She has never used smokeless tobacco. She reports current alcohol use. She reports that she does not use drugs.  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    Outpatient Medications:  Current Outpatient Medications   Medication Instructions    cholecalciferol (Vitamin D-3) 125 MCG (5000 UT) capsule 1 capsule, oral, Daily    escitalopram (LEXAPRO) 5 mg, oral, Every Day    ferrous sulfate 325 (65 Fe) MG tablet TAKE 1 TABLET BY MOUTH EVERY DAY    L. acidophilus/Bifid. animalis 32 billion cell capsule Take 1 capsule by mouth if needed.    multivitamin tablet 1 tablet, oral, Daily, Does not have iron    NON FORMULARY 1 each, oral, Daily, Magnesium, calcium and zinc     Last Recorded Vitals:  Vitals:    06/18/24 1102   Weight: 68 kg (150 lb)   Height: 1.676 m (5' 6\")       Physical Exam:  A+Ox3, NAD     Last Labs:  CBC -  Lab Results   Component Value Date    WBC 4.8 03/08/2024    HGB 11.8 (L) 03/08/2024    HCT 36.6 03/08/2024    MCV 90 03/08/2024     03/08/2024       CMP -  Lab Results   Component Value Date    CALCIUM 9.3 03/08/2024    PROT 6.4 03/08/2024    ALBUMIN 4.1 03/08/2024    AST 16 03/08/2024    ALT 12 03/08/2024    ALKPHOS 50 03/08/2024    BILITOT 0.3 03/08/2024       LIPID PANEL -   Lab Results   Component Value Date    CHOL 226 (H) 09/11/2023    TRIG 102 09/11/2023    HDL 68.9 09/11/2023    CHHDL 3.3 09/11/2023    LDLF 137 (H) 09/11/2023    VLDL 20 09/11/2023       RENAL FUNCTION PANEL -   Lab Results   Component Value Date    GLUCOSE 89 03/08/2024     03/08/2024    K 4.2 03/08/2024     03/08/2024    CO2 32 03/08/2024    ANIONGAP <7 (L) 03/08/2024    BUN 11 03/08/2024    CREATININE 0.58 03/08/2024    CALCIUM 9.3 03/08/2024    ALBUMIN 4.1 03/08/2024        Lab Results   Component Value Date    HGBA1C 5.0 04/23/2021     I have reviewed the above " labs & diagnostics    Assessment/Plan:  Paroxysmal ventricular tachycardia. Though only 3 beats of pVT noted on monitor  PVCs  Palpitations    Since starting magnesium, cutting caffeine intake & consistently taking her Iron supplement her symptoms have significantly decreased. At this time will continue to monitor clinically. If recurrence then can consider echocardiogram and/or stress test depending on clinical context.    Follow up 6 months    Tracy M Schwab, APRN-CNP

## 2024-06-24 LAB
CYTOLOGY CMNT CVX/VAG CYTO-IMP: NORMAL
LAB AP HPV GENOTYPE QUESTION: YES
LAB AP HPV HR: NORMAL
LABORATORY COMMENT REPORT: NORMAL
PATH REPORT.TOTAL CANCER: NORMAL

## 2024-07-01 ENCOUNTER — APPOINTMENT (OUTPATIENT)
Dept: CARDIOLOGY | Facility: CLINIC | Age: 49
End: 2024-07-01
Payer: COMMERCIAL

## 2024-07-23 ENCOUNTER — LAB (OUTPATIENT)
Dept: LAB | Facility: LAB | Age: 49
End: 2024-07-23
Payer: COMMERCIAL

## 2024-07-23 DIAGNOSIS — R00.2 PALPITATIONS: ICD-10-CM

## 2024-07-23 DIAGNOSIS — E55.9 VITAMIN D DEFICIENCY: ICD-10-CM

## 2024-07-23 LAB
25(OH)D3 SERPL-MCNC: 29 NG/ML (ref 30–100)
BASOPHILS # BLD AUTO: 0.03 X10*3/UL (ref 0–0.1)
BASOPHILS NFR BLD AUTO: 0.6 %
EOSINOPHIL # BLD AUTO: 0.09 X10*3/UL (ref 0–0.7)
EOSINOPHIL NFR BLD AUTO: 1.9 %
ERYTHROCYTE [DISTWIDTH] IN BLOOD BY AUTOMATED COUNT: 12.8 % (ref 11.5–14.5)
HCT VFR BLD AUTO: 38.9 % (ref 36–46)
HGB BLD-MCNC: 12.7 G/DL (ref 12–16)
IMM GRANULOCYTES # BLD AUTO: 0.01 X10*3/UL (ref 0–0.7)
IMM GRANULOCYTES NFR BLD AUTO: 0.2 % (ref 0–0.9)
IRON SATN MFR SERPL: 42 % (ref 25–45)
IRON SERPL-MCNC: 150 UG/DL (ref 35–150)
LYMPHOCYTES # BLD AUTO: 0.96 X10*3/UL (ref 1.2–4.8)
LYMPHOCYTES NFR BLD AUTO: 20.7 %
MCH RBC QN AUTO: 29.7 PG (ref 26–34)
MCHC RBC AUTO-ENTMCNC: 32.6 G/DL (ref 32–36)
MCV RBC AUTO: 91 FL (ref 80–100)
MONOCYTES # BLD AUTO: 0.35 X10*3/UL (ref 0.1–1)
MONOCYTES NFR BLD AUTO: 7.5 %
NEUTROPHILS # BLD AUTO: 3.2 X10*3/UL (ref 1.2–7.7)
NEUTROPHILS NFR BLD AUTO: 69.1 %
NRBC BLD-RTO: 0 /100 WBCS (ref 0–0)
PLATELET # BLD AUTO: 170 X10*3/UL (ref 150–450)
RBC # BLD AUTO: 4.27 X10*6/UL (ref 4–5.2)
TIBC SERPL-MCNC: 353 UG/DL (ref 240–445)
UIBC SERPL-MCNC: 203 UG/DL (ref 110–370)
WBC # BLD AUTO: 4.6 X10*3/UL (ref 4.4–11.3)

## 2024-07-23 PROCEDURE — 83550 IRON BINDING TEST: CPT

## 2024-07-23 PROCEDURE — 83540 ASSAY OF IRON: CPT

## 2024-07-23 PROCEDURE — 82306 VITAMIN D 25 HYDROXY: CPT

## 2024-07-23 PROCEDURE — 85025 COMPLETE CBC W/AUTO DIFF WBC: CPT

## 2024-07-23 PROCEDURE — 36415 COLL VENOUS BLD VENIPUNCTURE: CPT

## 2024-07-26 DIAGNOSIS — E61.1 IRON DEFICIENCY: ICD-10-CM

## 2024-07-26 DIAGNOSIS — E55.9 VITAMIN D DEFICIENCY: Primary | ICD-10-CM

## 2024-08-08 ENCOUNTER — APPOINTMENT (OUTPATIENT)
Dept: RADIOLOGY | Facility: CLINIC | Age: 49
End: 2024-08-08
Payer: COMMERCIAL

## 2024-09-26 ENCOUNTER — HOSPITAL ENCOUNTER (OUTPATIENT)
Dept: RADIOLOGY | Facility: CLINIC | Age: 49
Discharge: HOME | End: 2024-09-26
Payer: COMMERCIAL

## 2024-09-26 VITALS — HEIGHT: 66 IN | BODY MASS INDEX: 24.11 KG/M2 | WEIGHT: 150 LBS

## 2024-09-26 DIAGNOSIS — Z12.31 SCREENING MAMMOGRAM FOR BREAST CANCER: ICD-10-CM

## 2024-09-26 PROCEDURE — 77067 SCR MAMMO BI INCL CAD: CPT

## 2024-11-14 ENCOUNTER — LAB (OUTPATIENT)
Dept: LAB | Facility: LAB | Age: 49
End: 2024-11-14
Payer: COMMERCIAL

## 2024-11-14 DIAGNOSIS — E61.1 IRON DEFICIENCY: ICD-10-CM

## 2024-11-14 DIAGNOSIS — E55.9 VITAMIN D DEFICIENCY: ICD-10-CM

## 2024-11-14 LAB
25(OH)D3 SERPL-MCNC: 41 NG/ML (ref 30–100)
BASOPHILS # BLD AUTO: 0.03 X10*3/UL (ref 0–0.1)
BASOPHILS NFR BLD AUTO: 0.6 %
EOSINOPHIL # BLD AUTO: 0.13 X10*3/UL (ref 0–0.7)
EOSINOPHIL NFR BLD AUTO: 2.5 %
ERYTHROCYTE [DISTWIDTH] IN BLOOD BY AUTOMATED COUNT: 12.7 % (ref 11.5–14.5)
FERRITIN SERPL-MCNC: 36 NG/ML (ref 8–150)
HCT VFR BLD AUTO: 38.2 % (ref 36–46)
HGB BLD-MCNC: 12.4 G/DL (ref 12–16)
IMM GRANULOCYTES # BLD AUTO: 0.02 X10*3/UL (ref 0–0.7)
IMM GRANULOCYTES NFR BLD AUTO: 0.4 % (ref 0–0.9)
IRON SATN MFR SERPL: 19 % (ref 25–45)
IRON SERPL-MCNC: 59 UG/DL (ref 35–150)
LYMPHOCYTES # BLD AUTO: 0.89 X10*3/UL (ref 1.2–4.8)
LYMPHOCYTES NFR BLD AUTO: 17.4 %
MCH RBC QN AUTO: 29.2 PG (ref 26–34)
MCHC RBC AUTO-ENTMCNC: 32.5 G/DL (ref 32–36)
MCV RBC AUTO: 90 FL (ref 80–100)
MONOCYTES # BLD AUTO: 0.44 X10*3/UL (ref 0.1–1)
MONOCYTES NFR BLD AUTO: 8.6 %
NEUTROPHILS # BLD AUTO: 3.61 X10*3/UL (ref 1.2–7.7)
NEUTROPHILS NFR BLD AUTO: 70.5 %
NRBC BLD-RTO: 0 /100 WBCS (ref 0–0)
PLATELET # BLD AUTO: 156 X10*3/UL (ref 150–450)
RBC # BLD AUTO: 4.24 X10*6/UL (ref 4–5.2)
TIBC SERPL-MCNC: 307 UG/DL (ref 240–445)
UIBC SERPL-MCNC: 248 UG/DL (ref 110–370)
WBC # BLD AUTO: 5.1 X10*3/UL (ref 4.4–11.3)

## 2024-11-14 PROCEDURE — 82306 VITAMIN D 25 HYDROXY: CPT

## 2024-11-14 PROCEDURE — 82728 ASSAY OF FERRITIN: CPT

## 2024-11-14 PROCEDURE — 83540 ASSAY OF IRON: CPT

## 2024-11-14 PROCEDURE — 83550 IRON BINDING TEST: CPT

## 2024-11-14 PROCEDURE — 85025 COMPLETE CBC W/AUTO DIFF WBC: CPT

## 2024-11-14 PROCEDURE — 36415 COLL VENOUS BLD VENIPUNCTURE: CPT

## 2024-12-16 ENCOUNTER — PATIENT MESSAGE (OUTPATIENT)
Dept: PRIMARY CARE | Facility: CLINIC | Age: 49
End: 2024-12-16
Payer: COMMERCIAL

## 2024-12-17 ENCOUNTER — APPOINTMENT (OUTPATIENT)
Dept: CARDIOLOGY | Facility: CLINIC | Age: 49
End: 2024-12-17
Payer: COMMERCIAL

## 2024-12-17 VITALS
SYSTOLIC BLOOD PRESSURE: 116 MMHG | DIASTOLIC BLOOD PRESSURE: 74 MMHG | HEART RATE: 80 BPM | OXYGEN SATURATION: 99 % | WEIGHT: 150 LBS | BODY MASS INDEX: 24.11 KG/M2 | HEIGHT: 66 IN

## 2024-12-17 DIAGNOSIS — I49.3 PVC (PREMATURE VENTRICULAR CONTRACTION): Primary | ICD-10-CM

## 2024-12-17 DIAGNOSIS — R00.2 FLUTTERING SENSATION OF HEART: ICD-10-CM

## 2024-12-17 PROBLEM — I47.20 PAROXYSMAL VENTRICULAR TACHYCARDIA: Status: RESOLVED | Noted: 2024-04-18 | Resolved: 2024-12-17

## 2024-12-17 PROBLEM — I47.29 PAROXYSMAL VENTRICULAR TACHYCARDIA (MULTI): Status: RESOLVED | Noted: 2024-04-18 | Resolved: 2024-12-17

## 2024-12-17 PROCEDURE — 3008F BODY MASS INDEX DOCD: CPT | Performed by: NURSE PRACTITIONER

## 2024-12-17 PROCEDURE — 1036F TOBACCO NON-USER: CPT | Performed by: NURSE PRACTITIONER

## 2024-12-17 PROCEDURE — 99214 OFFICE O/P EST MOD 30 MIN: CPT | Performed by: NURSE PRACTITIONER

## 2024-12-17 PROCEDURE — 93000 ELECTROCARDIOGRAM COMPLETE: CPT | Performed by: NURSE PRACTITIONER

## 2024-12-17 ASSESSMENT — PAIN SCALES - GENERAL: PAINLEVEL_OUTOF10: 0-NO PAIN

## 2024-12-17 NOTE — PROGRESS NOTES
Name : Christina Landry   : 1975   MRN : [id number]   ENC Date : 2024    CC:   Follow up palpitations     HPI:    Christina Landry is a 49 y.o. female with PMHx sig for Palpitations seemingly associated with PVCs & HIREN who presents today as above.    OV 24:  Started getting palpations a little over a month ago. Notices it most when she is just sitting. The day she was taking her daughter on a college visit it happened 8-10x, further recurred 3-4 days in a row. No associated chest pain, pressure, SOB/QUIGLEY, PND, orthopnea, LE edema, lightheadedness, dizziness, or syncope.     Denies use of weight loss supplements, body-building supplements, energy supplements, amphetamines, and cocaine.     Caffeine- 16-20oz of coffee a day; sometimes Tea    Not much exercise beyond walking the dog every day for 20 mins    Known HIREN & has been on supplement for a long time. Hgb 11.8. Iron %sat 16. Mg 1.97. K 4.2 & TSH was normal    Evaluated by Cardiology in  for same symptoms. Per Cardiology note, echo was normal with no significant valvular abnormalities & the event monitor showed NSR.    2 week event monitor was done prior to this visit. Her symptoms correlate with PVCs & ST.    VV 24: Since starting magnesium, cutting caffeine intake & consistently taking her Iron supplement her symptoms have significantly decreased.    OV 24: Doing really well. Very rare palpitations now that she has cut back caffeine, continues to take her magnesium supplement & up until a few days ago was taking her iron supplement. Thought she was supposed to stop based on message from PCP     CV Diagnostics:  Event monitor 3/14/24 - 3/28/24: predominantly Sinus rhythm. Max  bpm, minimum HR 56 bpm, PVC/PAC burden <1%. 1 occurrence of VT, 3 beats,  bpm. 19 patient triggers correlating with SVE, PVC & Sinus tachycardia    ROS: unless otherwise noted in the history of present illness, all other systems were reviewed and they are  "negative for complaints     SHx:  She reports that she has never smoked. She has never used smokeless tobacco. She reports current alcohol use. She reports that she does not use drugs.  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    Outpatient Medications:  Current Outpatient Medications   Medication Instructions    cholecalciferol (Vitamin D-3) 125 MCG (5000 UT) capsule 1 capsule    escitalopram (LEXAPRO) 5 mg, oral, Every Day    L. acidophilus/Bifid. animalis 32 billion cell capsule Take 1 capsule by mouth if needed.    multivitamin tablet 1 tablet, Daily    NON FORMULARY 1 each, Daily     Last Recorded Vitals:  Vitals:    12/17/24 1032   Weight: 68 kg (150 lb)   Height: 1.676 m (5' 6\")       Physical Exam:  On exam Ms. Christina Landry appears her stated age, is alert and oriented x3, and in no acute distress. Her sclera are anicteric and her oropharynx has moist mucous membranes. Her neck is supple and without thyromegaly. The JVP is ~5 cm of water above the right atrium. Her cardiac exam has regular rhythm, normal S1, S2. No S3/4. There are no murmurs. Her lungs are clear to auscultation bilaterally and there is no dullness to percussion. Her abdomen is soft, nontender with normoactive bowel sounds. There is no HJR. The extremities are warm and without edema. The skin is dry. There is no rash present. The distal pulses are 2-3+ in all four extremities. Her mood and affect are appropriate for todays encounter.      Last Labs:  CBC -  Lab Results   Component Value Date    WBC 5.1 11/14/2024    HGB 12.4 11/14/2024    HCT 38.2 11/14/2024    MCV 90 11/14/2024     11/14/2024       CMP -  Lab Results   Component Value Date    CALCIUM 9.3 03/08/2024    PROT 6.4 03/08/2024    ALBUMIN 4.1 03/08/2024    AST 16 03/08/2024    ALT 12 03/08/2024    ALKPHOS 50 03/08/2024    BILITOT 0.3 03/08/2024       LIPID PANEL " -   Lab Results   Component Value Date    CHOL 226 (H) 09/11/2023    TRIG 102 09/11/2023    HDL 68.9 09/11/2023    CHHDL 3.3 09/11/2023    LDLF 137 (H) 09/11/2023    VLDL 20 09/11/2023       RENAL FUNCTION PANEL -   Lab Results   Component Value Date    GLUCOSE 89 03/08/2024     03/08/2024    K 4.2 03/08/2024     03/08/2024    CO2 32 03/08/2024    ANIONGAP <7 (L) 03/08/2024    BUN 11 03/08/2024    CREATININE 0.58 03/08/2024    CALCIUM 9.3 03/08/2024    ALBUMIN 4.1 03/08/2024        Lab Results   Component Value Date    HGBA1C 5.0 04/23/2021     I have reviewed the above labs & diagnostics    Assessment/Plan:  PVCs. [place] < 1%  Palpitations    Continue with low caffeine intake & magnesium supplement as this is working. I reviewed note from PCP on her labs, there is no communication to stop iron. As her Iron levels are normal on iron supplementation, and this was seemingly another component to her palpitations, I would continue on Iron supplement.    If recurrence of palpitations then can consider echocardiogram    Follow up as needed    Tracy M Schwab, APRN-CNP

## 2025-01-07 ENCOUNTER — APPOINTMENT (OUTPATIENT)
Dept: PRIMARY CARE | Facility: CLINIC | Age: 50
End: 2025-01-07
Payer: COMMERCIAL

## 2025-02-27 ENCOUNTER — TELEPHONE (OUTPATIENT)
Dept: PRIMARY CARE | Facility: CLINIC | Age: 50
End: 2025-02-27
Payer: COMMERCIAL

## 2025-02-27 NOTE — TELEPHONE ENCOUNTER
Patient called states she had a hemorrhoid   burst a couple days ago and has bright red blood when having bowel movements   Patient what are your recommendations?  She does have some suppositories   Had this before but doesn't remember how long its lasted now going on 4th day   Please advise

## 2025-03-08 ENCOUNTER — OFFICE VISIT (OUTPATIENT)
Dept: URGENT CARE | Age: 50
End: 2025-03-08
Payer: COMMERCIAL

## 2025-03-08 VITALS
DIASTOLIC BLOOD PRESSURE: 73 MMHG | HEART RATE: 90 BPM | OXYGEN SATURATION: 98 % | TEMPERATURE: 97.3 F | SYSTOLIC BLOOD PRESSURE: 129 MMHG | RESPIRATION RATE: 16 BRPM

## 2025-03-08 DIAGNOSIS — R39.89 SUSPECTED UTI: Primary | ICD-10-CM

## 2025-03-08 DIAGNOSIS — R30.0 DYSURIA: ICD-10-CM

## 2025-03-08 LAB
POC BILIRUBIN, URINE: NEGATIVE
POC BLOOD, URINE: ABNORMAL
POC GLUCOSE, URINE: NEGATIVE MG/DL
POC KETONES, URINE: NEGATIVE MG/DL
POC LEUKOCYTES, URINE: ABNORMAL
POC NITRITE,URINE: NEGATIVE
POC PH, URINE: 6 PH
POC PROTEIN, URINE: NEGATIVE MG/DL
POC SPECIFIC GRAVITY, URINE: 1.02
POC UROBILINOGEN, URINE: 0.2 EU/DL
PREGNANCY TEST URINE, POC: NEGATIVE

## 2025-03-08 PROCEDURE — 81025 URINE PREGNANCY TEST: CPT | Performed by: PHYSICIAN ASSISTANT

## 2025-03-08 PROCEDURE — 99214 OFFICE O/P EST MOD 30 MIN: CPT | Performed by: PHYSICIAN ASSISTANT

## 2025-03-08 PROCEDURE — 81003 URINALYSIS AUTO W/O SCOPE: CPT | Performed by: PHYSICIAN ASSISTANT

## 2025-03-08 PROCEDURE — 1036F TOBACCO NON-USER: CPT | Performed by: PHYSICIAN ASSISTANT

## 2025-03-08 RX ORDER — NITROFURANTOIN 25; 75 MG/1; MG/1
100 CAPSULE ORAL 2 TIMES DAILY
Qty: 10 CAPSULE | Refills: 0 | Status: SHIPPED | OUTPATIENT
Start: 2025-03-08 | End: 2025-03-13

## 2025-03-08 NOTE — PROGRESS NOTES
Subjective   Patient ID: Christina Landry is a 49 y.o. female. They present today with a chief complaint of UTI.    CC: UTI symptoms x 3 days    HPI: Patient presenting for concerns of  a UTI.  Symptoms include suprapubic discomfort, pressure, urinary frequency, urgency, and dysuria.      No abdominal pain, nausea, vomiting, diarrhea, rash.  No flank pain.  No concern for STD.  No  skin irritation, discharge or other lesions.    Past Medical History  Allergies as of 03/08/2025    (No Known Allergies)       (Not in a hospital admission)       Past Medical History:   Diagnosis Date    Fibrocystic breast 1999    History of Holter monitoring 04/09/2024    The Max  bpm, the Minimum HR recorded was 56 bpm, 03/28 03:05:50, and the Average Heart Rate was 87 bpm. *There were 323 VE beats with a burden of <1 %. There was 1 occurrence of Ventricular Tachycardia with the  Fastest episode 167 bpm, 03/25 16:15:59, and the Longest episode 3 beats, 03/25 16:15:59.There were 471 SVE beats,<1 % 19 Patient Trigger correlating with SVE, PVC, sinus tachycardia       Past Surgical History:   Procedure Laterality Date    BREAST BIOPSY Left 2016    Benign core breast biopsy (3 sites)    BREAST CYST EXCISION Right 1999    Benign right breast excisional biopsy    COLONOSCOPY  08/23/2022    2018        reports that she has never smoked. She has never used smokeless tobacco. She reports current alcohol use. She reports that she does not use drugs.    Review of Systems  Review of Systems    After reviewing all body systems I have documented pertinent findings above in the history.  All other Systems reviewed and are negative for complaint.  Pertinent positive and negatives are listed in the above HPI.    Objective    Vitals:    03/08/25 1121   BP: 129/73   Pulse: 90   Resp: 16   Temp: 36.3 °C (97.3 °F)   SpO2: 98%     No LMP recorded.  Physical Exam    General: Alert, oriented, and cooperative.  No acute distress. Well developed, well  nourished.     Skin: Skin is warm, and dry. No rashes or lesions.    Eyes: Sclera and conjunctivae normal     Neck: Supple.     Cardiac: Regular rate and rhythm    Respiratory:  No acute respiratory distress.  Regular rate of breathing.  No accessory muscle use.  No tripoding.      Abdomen: Soft, nontender.  No CVA tenderness.    Procedures  Point of Care Test & Imaging Results from this visit    No results found.  Diagnostic study results (if any) were reviewed by [facility].  Assessment/Plan   Allergies, medications, history, and pertinent labs/EKGs/Imaging reviewed by Marc Cabral PA-C.     MDM:  Patient presenting for UTI type symptoms.    Reports no concerns for STD.  No reported nausea or vomiting. Denied flank and back pain. Denies  lesions, vaginal discharge, rash, or tenderness.     Patient overall looks well.  Does not appear systemically ill or toxic.  No abdominal tenderness, guarding or rebound. No CVA tenderness.     Urine: Dip: Suspicious for UTI.  Positive leukocytes and blood.  Urine culture: Pending      No clinical findings to suggest Pyelonephritis or Urinary Stone.    Low concerns for STD, PID, HSV, or irritant causes.     Advised follow-up with PCP for reevaluation. Pt/family instructed to return if symptoms worsen or if new symptoms develop. Patient/family expressed understanding and consented to the above plan. No barriers of communication were apparent and I answered all questions.      Orders and Diagnoses  Diagnoses and all orders for this visit:  Suspected UTI  -     nitrofurantoin, macrocrystal-monohydrate, (Macrobid) 100 mg capsule; Take 1 capsule (100 mg) by mouth 2 times a day for 5 days.  Dysuria  -     Urine Culture  -     POCT UA Automated manually resulted  -     POCT pregnancy, urine manually resulted  -     nitrofurantoin, macrocrystal-monohydrate, (Macrobid) 100 mg capsule; Take 1 capsule (100 mg) by mouth 2 times a day for 5 days.          Patient disposition:  Home suspected UTI hey good morning how are you    Electronically signed by [facility]  11:37 AM

## 2025-03-08 NOTE — PATIENT INSTRUCTIONS
"    You were seen for urinary symptoms.    Urine dip: Suspicious for UTI (urinary tract infection)  Urine culture: Pending.  You will be notified if your antibiotic needs to be changed based on sensitivity results.    UTI    Plan:  1. Discharge home.  2. Take all medication as directed: Antibiotic.     3. Drink plenty of liquids such as sugar free cranberry juice  4. Follow up with your PCP in 2-3 days to discuss urine culture results and for reevaluation.    If your urine culture results are negative for bacterial growth, is contaminated, if you are still having symptoms despite treatment, or if your symptoms are lasting longer than expected; I advise follow up with your PCP to discuss test results and to have a reevaluation in order to rule out any other causes of your symptoms.    5. Return to the UC or ED immediately if new or worsening symptoms develop    Some approaches to preventing urinary tract infections are:  -Drinking more fluids  -Drinking cranberry juice  -Avoid bubble baths  -Encouraging \"females\" to wipe with toilet paper from front to back.    Go to the emergency department if you have any new or worsening symptoms.  -Worsening nausea and vomiting  -The inability to urinate  -Vaginal discharge, irritation or lesions  -Fever that lasts more than 2 days while taking an antibiotic  -Rash or swelling following an antibiotic  -Worsening back pain, fever, chills    "

## 2025-03-10 LAB — BACTERIA UR CULT: NORMAL

## 2025-03-16 ASSESSMENT — ENCOUNTER SYMPTOMS
WEIGHT LOSS: 0
FLATUS: 0
HEMATURIA: 0
FREQUENCY: 0
HEADACHES: 0
FEVER: 0
HEMATOCHEZIA: 1
NAUSEA: 0
DIARRHEA: 0
ABDOMINAL PAIN: 1
DYSURIA: 0
CONSTIPATION: 1
ANOREXIA: 0
VOMITING: 0
MYALGIAS: 1
BELCHING: 0
ARTHRALGIAS: 0

## 2025-03-16 NOTE — PROGRESS NOTES
Problem List Items Addressed This Visit    None       Subjective   Patient ID: Christina Landry is a 49 y.o. female who presents for No chief complaint on file..  HPI  Component      Latest Ref Rng 11/14/2024 3/8/2025   WBC      4.4 - 11.3 x10*3/uL 5.1     nRBC      0.0 - 0.0 /100 WBCs 0.0     RBC      4.00 - 5.20 x10*6/uL 4.24     HEMOGLOBIN      12.0 - 16.0 g/dL 12.4     HEMATOCRIT      36.0 - 46.0 % 38.2     MCV      80 - 100 fL 90     MCH      26.0 - 34.0 pg 29.2     MCHC      32.0 - 36.0 g/dL 32.5     RED CELL DISTRIBUTION WIDTH      11.5 - 14.5 % 12.7     Platelets      150 - 450 x10*3/uL 156     Neutrophils %      40.0 - 80.0 % 70.5     Immature Granulocytes %, Automated      0.0 - 0.9 % 0.4     Lymphocytes %      13.0 - 44.0 % 17.4     Monocytes %      2.0 - 10.0 % 8.6     Eosinophils %      0.0 - 6.0 % 2.5     Basophils %      0.0 - 2.0 % 0.6     Neutrophils Absolute      1.20 - 7.70 x10*3/uL 3.61     Immature Granulocytes Absolute, Automated      0.00 - 0.70 x10*3/uL 0.02     Lymphocytes Absolute      1.20 - 4.80 x10*3/uL 0.89 (L)     Monocytes Absolute      0.10 - 1.00 x10*3/uL 0.44     Eosinophils Absolute      0.00 - 0.70 x10*3/uL 0.13     Basophils Absolute      0.00 - 0.10 x10*3/uL 0.03     POC Glucose, Urine      NEGATIVE mg/dl  NEGATIVE    POC Bilirubin, Urine      NEGATIVE   NEGATIVE    POC Ketones, Urine      NEGATIVE mg/dl  NEGATIVE    POC Specific Gravity, Urine      1.005 - 1.035   1.020    POC Blood, Urine      NEGATIVE   SMALL (1+) !    POC PH, Urine      No Reference Range Established PH  6.0    POC Protein, Urine      NEGATIVE mg/dl  NEGATIVE    POC Urobilinogen, Urine      0.2, 1.0 EU/DL  0.2    Poc Nitrite, Urine      NEGATIVE   NEGATIVE    POC Leukocytes, Urine      NEGATIVE   TRACE !    IRON      35 - 150 ug/dL 59     UIBC      110 - 370 ug/dL 248     TIBC      240 - 445 ug/dL 307     % Saturation      25 - 45 % 19 (L)     FERRITIN      8 - 150 ng/mL 36     Vitamin D, 25-Hydroxy, Total       "30 - 100 ng/mL 41     Preg Test, Ur      Negative   Negative      Review of Systems   All other systems reviewed and are negative.      BP Readings from Last 3 Encounters:   03/08/25 129/73   12/17/24 116/74   06/12/24 115/81      Wt Readings from Last 3 Encounters:   12/17/24 68 kg (150 lb)   09/26/24 68 kg (150 lb)   06/18/24 68 kg (150 lb)      BMI:   Estimated body mass index is 24.21 kg/m² as calculated from the following:    Height as of 12/17/24: 1.676 m (5' 6\").    Weight as of 12/17/24: 68 kg (150 lb).    Objective   Physical Exam  Constitutional:       General: She is not in acute distress.  HENT:      Head: Normocephalic and atraumatic.      Right Ear: Tympanic membrane and external ear normal.      Left Ear: Tympanic membrane and external ear normal.      Nose: Nose normal.      Mouth/Throat:      Mouth: Mucous membranes are moist.   Eyes:      Extraocular Movements: Extraocular movements intact.      Conjunctiva/sclera: Conjunctivae normal.   Neck:      Vascular: No carotid bruit.   Cardiovascular:      Rate and Rhythm: Normal rate and regular rhythm.      Pulses: Normal pulses.   Pulmonary:      Effort: Pulmonary effort is normal.      Breath sounds: Normal breath sounds.   Abdominal:      General: Bowel sounds are normal.      Palpations: Abdomen is soft.   Musculoskeletal:         General: Normal range of motion.      Cervical back: Normal range of motion and neck supple.   Lymphadenopathy:      Cervical: No cervical adenopathy.   Skin:     General: Skin is warm and dry.   Neurological:      General: No focal deficit present.      Mental Status: She is alert.   Psychiatric:         Mood and Affect: Mood normal.       " "  Immature Granulocytes Absolute, Automated      0.00 - 0.70 x10*3/uL 0.02     Lymphocytes Absolute      1.20 - 4.80 x10*3/uL 0.89 (L)     Monocytes Absolute      0.10 - 1.00 x10*3/uL 0.44     Eosinophils Absolute      0.00 - 0.70 x10*3/uL 0.13     Basophils Absolute      0.00 - 0.10 x10*3/uL 0.03     POC Glucose, Urine      NEGATIVE mg/dl  NEGATIVE    POC Bilirubin, Urine      NEGATIVE   NEGATIVE    POC Ketones, Urine      NEGATIVE mg/dl  NEGATIVE    POC Specific Gravity, Urine      1.005 - 1.035   1.020    POC Blood, Urine      NEGATIVE   SMALL (1+) !    POC PH, Urine      No Reference Range Established PH  6.0    POC Protein, Urine      NEGATIVE mg/dl  NEGATIVE    POC Urobilinogen, Urine      0.2, 1.0 EU/DL  0.2    Poc Nitrite, Urine      NEGATIVE   NEGATIVE    POC Leukocytes, Urine      NEGATIVE   TRACE !    IRON      35 - 150 ug/dL 59     UIBC      110 - 370 ug/dL 248     TIBC      240 - 445 ug/dL 307     % Saturation      25 - 45 % 19 (L)     FERRITIN      8 - 150 ng/mL 36     Vitamin D, 25-Hydroxy, Total      30 - 100 ng/mL 41     Preg Test, Ur      Negative   Negative      Review of Systems   Constitutional:  Negative for fever and weight loss.   Gastrointestinal:  Positive for abdominal pain, constipation and hematochezia. Negative for anorexia, diarrhea, flatus, melena, nausea and vomiting.   Genitourinary:  Negative for dysuria, frequency and hematuria.   Musculoskeletal:  Positive for myalgias. Negative for arthralgias.   Neurological:  Negative for headaches.   All other systems reviewed and are negative.      BP Readings from Last 3 Encounters:   03/17/25 115/73   03/08/25 129/73   12/17/24 116/74      Wt Readings from Last 3 Encounters:   03/17/25 72.1 kg (159 lb)   12/17/24 68 kg (150 lb)   09/26/24 68 kg (150 lb)      BMI:   Estimated body mass index is 25.66 kg/m² as calculated from the following:    Height as of this encounter: 1.676 m (5' 6\").    Weight as of this encounter: 72.1 kg (159 " lb).    Objective   Physical Exam  Constitutional:       General: She is not in acute distress.  HENT:      Head: Normocephalic and atraumatic.      Nose: Nose normal.      Mouth/Throat:      Mouth: Mucous membranes are moist.   Eyes:      Extraocular Movements: Extraocular movements intact.      Conjunctiva/sclera: Conjunctivae normal.   Neck:      Vascular: No carotid bruit.   Cardiovascular:      Rate and Rhythm: Normal rate and regular rhythm.      Pulses: Normal pulses.   Pulmonary:      Effort: Pulmonary effort is normal.      Breath sounds: Normal breath sounds.   Abdominal:      General: Bowel sounds are normal. There is no distension.      Palpations: Abdomen is soft. There is no mass.      Tenderness: There is no right CVA tenderness, left CVA tenderness, guarding or rebound.      Comments: RLQ cramping with deep palpation    Musculoskeletal:         General: Normal range of motion.      Cervical back: Normal range of motion and neck supple.   Lymphadenopathy:      Cervical: No cervical adenopathy.   Skin:     General: Skin is warm and dry.   Neurological:      General: No focal deficit present.      Mental Status: She is alert.   Psychiatric:         Mood and Affect: Mood normal.

## 2025-03-17 ENCOUNTER — HOSPITAL ENCOUNTER (OUTPATIENT)
Dept: RADIOLOGY | Facility: CLINIC | Age: 50
Discharge: HOME | End: 2025-03-17
Payer: COMMERCIAL

## 2025-03-17 ENCOUNTER — APPOINTMENT (OUTPATIENT)
Dept: PRIMARY CARE | Facility: CLINIC | Age: 50
End: 2025-03-17
Payer: COMMERCIAL

## 2025-03-17 VITALS
BODY MASS INDEX: 25.55 KG/M2 | SYSTOLIC BLOOD PRESSURE: 115 MMHG | TEMPERATURE: 99.1 F | WEIGHT: 159 LBS | HEIGHT: 66 IN | HEART RATE: 83 BPM | DIASTOLIC BLOOD PRESSURE: 73 MMHG | OXYGEN SATURATION: 98 %

## 2025-03-17 DIAGNOSIS — R10.9 ABDOMINAL CRAMPING: ICD-10-CM

## 2025-03-17 DIAGNOSIS — R31.9 HEMATURIA, UNSPECIFIED TYPE: ICD-10-CM

## 2025-03-17 DIAGNOSIS — K59.09 OTHER CONSTIPATION: ICD-10-CM

## 2025-03-17 DIAGNOSIS — R10.9 ABDOMINAL CRAMPING: Primary | ICD-10-CM

## 2025-03-17 DIAGNOSIS — R58 BLOOD ON TOILET PAPER: ICD-10-CM

## 2025-03-17 PROCEDURE — 74018 RADEX ABDOMEN 1 VIEW: CPT

## 2025-03-17 PROCEDURE — 99214 OFFICE O/P EST MOD 30 MIN: CPT | Performed by: NURSE PRACTITIONER

## 2025-03-17 PROCEDURE — 74018 RADEX ABDOMEN 1 VIEW: CPT | Performed by: RADIOLOGY

## 2025-03-17 PROCEDURE — 1036F TOBACCO NON-USER: CPT | Performed by: NURSE PRACTITIONER

## 2025-03-17 PROCEDURE — 3008F BODY MASS INDEX DOCD: CPT | Performed by: NURSE PRACTITIONER

## 2025-03-17 ASSESSMENT — ENCOUNTER SYMPTOMS
HEADACHES: 0
ANOREXIA: 0
ARTHRALGIAS: 0
HEMATOCHEZIA: 1
BELCHING: 0
ABDOMINAL PAIN: 1
CONSTIPATION: 1
DIARRHEA: 0
FEVER: 0
MYALGIAS: 1
NAUSEA: 0
HEMATURIA: 0
FREQUENCY: 0
WEIGHT LOSS: 0
VOMITING: 0
DYSURIA: 0
FLATUS: 0

## 2025-03-20 ENCOUNTER — APPOINTMENT (OUTPATIENT)
Dept: RADIOLOGY | Facility: CLINIC | Age: 50
End: 2025-03-20
Payer: COMMERCIAL

## 2025-03-21 LAB
ALBUMIN SERPL-MCNC: 4.4 G/DL (ref 3.6–5.1)
ALP SERPL-CCNC: 45 U/L (ref 31–125)
ALT SERPL-CCNC: 22 U/L (ref 6–29)
AMYLASE SERPL-CCNC: 54 U/L (ref 21–101)
ANION GAP SERPL CALCULATED.4IONS-SCNC: 6 MMOL/L (CALC) (ref 7–17)
APPEARANCE UR: CLEAR
AST SERPL-CCNC: 18 U/L (ref 10–35)
BACTERIA #/AREA URNS HPF: ABNORMAL /HPF
BACTERIA UR CULT: ABNORMAL
BACTERIA UR CULT: ABNORMAL
BASOPHILS # BLD AUTO: 19 CELLS/UL (ref 0–200)
BASOPHILS NFR BLD AUTO: 0.4 %
BILIRUB SERPL-MCNC: 0.6 MG/DL (ref 0.2–1.2)
BILIRUB UR QL STRIP: NEGATIVE
BUN SERPL-MCNC: 11 MG/DL (ref 7–25)
CALCIUM SERPL-MCNC: 9.2 MG/DL (ref 8.6–10.2)
CHLORIDE SERPL-SCNC: 106 MMOL/L (ref 98–110)
CO2 SERPL-SCNC: 27 MMOL/L (ref 20–32)
COLOR UR: ABNORMAL
CREAT SERPL-MCNC: 0.65 MG/DL (ref 0.5–0.99)
EGFRCR SERPLBLD CKD-EPI 2021: 109 ML/MIN/1.73M2
EOSINOPHIL # BLD AUTO: 108 CELLS/UL (ref 15–500)
EOSINOPHIL NFR BLD AUTO: 2.3 %
ERYTHROCYTE [DISTWIDTH] IN BLOOD BY AUTOMATED COUNT: 13 % (ref 11–15)
GLUCOSE SERPL-MCNC: 90 MG/DL (ref 65–99)
GLUCOSE UR QL STRIP: NEGATIVE
HCT VFR BLD AUTO: 40 % (ref 35–45)
HGB BLD-MCNC: 13.3 G/DL (ref 11.7–15.5)
HGB UR QL STRIP: ABNORMAL
KETONES UR QL STRIP: NEGATIVE
LEUKOCYTE ESTERASE UR QL STRIP: ABNORMAL
LIPASE SERPL-CCNC: 31 U/L (ref 7–60)
LYMPHOCYTES # BLD AUTO: 978 CELLS/UL (ref 850–3900)
LYMPHOCYTES NFR BLD AUTO: 20.8 %
MCH RBC QN AUTO: 29.3 PG (ref 27–33)
MCHC RBC AUTO-ENTMCNC: 33.3 G/DL (ref 32–36)
MCV RBC AUTO: 88.1 FL (ref 80–100)
MONOCYTES # BLD AUTO: 381 CELLS/UL (ref 200–950)
MONOCYTES NFR BLD AUTO: 8.1 %
NEUTROPHILS # BLD AUTO: 3215 CELLS/UL (ref 1500–7800)
NEUTROPHILS NFR BLD AUTO: 68.4 %
NITRITE UR QL STRIP: NEGATIVE
PH UR STRIP: 7 [PH] (ref 5–8)
PLATELET # BLD AUTO: 135 THOUSAND/UL (ref 140–400)
PMV BLD REES-ECKER: 13.2 FL (ref 7.5–12.5)
POTASSIUM SERPL-SCNC: 4.9 MMOL/L (ref 3.5–5.3)
PROT SERPL-MCNC: 6.5 G/DL (ref 6.1–8.1)
PROT UR QL STRIP: NEGATIVE
RBC # BLD AUTO: 4.54 MILLION/UL (ref 3.8–5.1)
RBC #/AREA URNS HPF: ABNORMAL /HPF
SERVICE CMNT-IMP: ABNORMAL
SODIUM SERPL-SCNC: 139 MMOL/L (ref 135–146)
SP GR UR STRIP: 1.01 (ref 1–1.03)
SQUAMOUS #/AREA URNS HPF: ABNORMAL /HPF
WBC # BLD AUTO: 4.7 THOUSAND/UL (ref 3.8–10.8)
WBC #/AREA URNS HPF: ABNORMAL /HPF

## 2025-03-27 ENCOUNTER — APPOINTMENT (OUTPATIENT)
Dept: RADIOLOGY | Facility: CLINIC | Age: 50
End: 2025-03-27
Payer: COMMERCIAL

## 2025-04-01 LAB — HEMOCCULT STL QL IA: NORMAL

## 2025-04-08 ENCOUNTER — APPOINTMENT (OUTPATIENT)
Dept: RADIOLOGY | Facility: CLINIC | Age: 50
End: 2025-04-08
Payer: COMMERCIAL

## 2025-04-10 DIAGNOSIS — R10.9 ABDOMINAL CRAMPING: ICD-10-CM

## 2025-04-10 DIAGNOSIS — R31.9 HEMATURIA, UNSPECIFIED TYPE: ICD-10-CM

## 2025-05-13 ENCOUNTER — APPOINTMENT (OUTPATIENT)
Dept: OBSTETRICS AND GYNECOLOGY | Facility: CLINIC | Age: 50
End: 2025-05-13
Payer: COMMERCIAL

## 2025-05-13 ASSESSMENT — ENCOUNTER SYMPTOMS
ABDOMINAL PAIN: 0
WEAKNESS: 0
LEG PAIN: 0
HEADACHES: 0
PARESIS: 0
NUMBNESS: 0
PARESTHESIAS: 0
TINGLING: 0
FEVER: 0
BOWEL INCONTINENCE: 0
BACK PAIN: 1
PERIANAL NUMBNESS: 0
DYSURIA: 0
WEIGHT LOSS: 0

## 2025-06-05 ENCOUNTER — APPOINTMENT (OUTPATIENT)
Dept: OBSTETRICS AND GYNECOLOGY | Facility: CLINIC | Age: 50
End: 2025-06-05
Payer: COMMERCIAL

## 2025-06-13 ENCOUNTER — APPOINTMENT (OUTPATIENT)
Dept: PRIMARY CARE | Facility: CLINIC | Age: 50
End: 2025-06-13
Payer: COMMERCIAL

## 2025-07-03 ASSESSMENT — ENCOUNTER SYMPTOMS
HEADACHES: 0
NUMBNESS: 0
TINGLING: 0
BACK PAIN: 1
FEVER: 0
PARESTHESIAS: 0
PERIANAL NUMBNESS: 0
LEG PAIN: 0
DYSURIA: 0
WEIGHT LOSS: 0
ABDOMINAL PAIN: 0
PARESIS: 0
BOWEL INCONTINENCE: 0
WEAKNESS: 0

## 2025-07-09 ENCOUNTER — APPOINTMENT (OUTPATIENT)
Dept: OBSTETRICS AND GYNECOLOGY | Facility: CLINIC | Age: 50
End: 2025-07-09
Payer: COMMERCIAL

## 2025-07-20 DIAGNOSIS — F41.9 ANXIETY: ICD-10-CM

## 2025-07-20 RX ORDER — ESCITALOPRAM OXALATE 5 MG/1
5 TABLET ORAL
Qty: 90 TABLET | Refills: 1 | Status: SHIPPED | OUTPATIENT
Start: 2025-07-20

## 2025-08-12 ENCOUNTER — APPOINTMENT (OUTPATIENT)
Dept: PRIMARY CARE | Facility: CLINIC | Age: 50
End: 2025-08-12
Payer: COMMERCIAL

## 2025-08-26 ENCOUNTER — APPOINTMENT (OUTPATIENT)
Dept: OBSTETRICS AND GYNECOLOGY | Facility: CLINIC | Age: 50
End: 2025-08-26
Payer: COMMERCIAL

## 2025-09-16 ENCOUNTER — APPOINTMENT (OUTPATIENT)
Dept: PRIMARY CARE | Facility: CLINIC | Age: 50
End: 2025-09-16
Payer: COMMERCIAL

## 2025-11-04 ENCOUNTER — APPOINTMENT (OUTPATIENT)
Dept: OBSTETRICS AND GYNECOLOGY | Facility: CLINIC | Age: 50
End: 2025-11-04
Payer: COMMERCIAL